# Patient Record
Sex: FEMALE | Race: WHITE
[De-identification: names, ages, dates, MRNs, and addresses within clinical notes are randomized per-mention and may not be internally consistent; named-entity substitution may affect disease eponyms.]

---

## 2019-02-19 ENCOUNTER — HOSPITAL ENCOUNTER (OUTPATIENT)
Dept: HOSPITAL 92 - LABBT | Age: 29
Discharge: HOME | End: 2019-02-19
Attending: UROLOGY
Payer: COMMERCIAL

## 2019-02-19 DIAGNOSIS — N20.0: ICD-10-CM

## 2019-02-19 DIAGNOSIS — Z01.812: Primary | ICD-10-CM

## 2019-02-19 LAB
ANION GAP SERPL CALC-SCNC: 14 MMOL/L (ref 10–20)
APTT PPP: 29.6 SEC (ref 22.9–36.1)
BUN SERPL-MCNC: 9 MG/DL (ref 7–18.7)
CALCIUM SERPL-MCNC: 9.6 MG/DL (ref 7.8–10.44)
CHLORIDE SERPL-SCNC: 108 MMOL/L (ref 98–107)
CO2 SERPL-SCNC: 22 MMOL/L (ref 22–29)
CREAT CL PREDICTED SERPL C-G-VRATE: 0 ML/MIN (ref 70–130)
GLUCOSE SERPL-MCNC: 112 MG/DL (ref 70–105)
HGB BLD-MCNC: 14.1 G/DL (ref 12–16)
INR PPP: 0.9
MCH RBC QN AUTO: 31.7 PG (ref 27–31)
MCV RBC AUTO: 95.3 FL (ref 78–98)
PLATELET # BLD AUTO: 245 THOU/UL (ref 130–400)
PLATELET # BLD: 249 THOU/UL (ref 130–400)
POTASSIUM SERPL-SCNC: 3.7 MMOL/L (ref 3.5–5.1)
PREGS CONTROL BACKGROUND?: (no result)
PREGS CONTROL BAR APPEAR?: YES
PROTHROMBIN TIME: 12.4 SEC (ref 12–14.7)
RBC # BLD AUTO: 4.44 MILL/UL (ref 4.2–5.4)
SODIUM SERPL-SCNC: 140 MMOL/L (ref 136–145)
WBC # BLD AUTO: 6.4 THOU/UL (ref 4.8–10.8)

## 2019-02-19 PROCEDURE — 85027 COMPLETE CBC AUTOMATED: CPT

## 2019-02-19 PROCEDURE — 80048 BASIC METABOLIC PNL TOTAL CA: CPT

## 2019-02-19 PROCEDURE — 85576 BLOOD PLATELET AGGREGATION: CPT

## 2019-02-19 PROCEDURE — 85610 PROTHROMBIN TIME: CPT

## 2019-02-19 PROCEDURE — 85730 THROMBOPLASTIN TIME PARTIAL: CPT

## 2019-02-19 PROCEDURE — 84703 CHORIONIC GONADOTROPIN ASSAY: CPT

## 2019-02-20 ENCOUNTER — HOSPITAL ENCOUNTER (OUTPATIENT)
Dept: HOSPITAL 92 - SDC | Age: 29
Setting detail: OBSERVATION
LOS: 1 days | Discharge: HOME | End: 2019-02-21
Attending: UROLOGY | Admitting: UROLOGY
Payer: COMMERCIAL

## 2019-02-20 VITALS — BODY MASS INDEX: 24.3 KG/M2

## 2019-02-20 DIAGNOSIS — Z79.899: ICD-10-CM

## 2019-02-20 DIAGNOSIS — N20.0: Primary | ICD-10-CM

## 2019-02-20 LAB — HGB BLD-MCNC: 12.8 G/DL (ref 12–16)

## 2019-02-20 PROCEDURE — 90471 IMMUNIZATION ADMIN: CPT

## 2019-02-20 PROCEDURE — 90686 IIV4 VACC NO PRSV 0.5 ML IM: CPT

## 2019-02-20 PROCEDURE — 36415 COLL VENOUS BLD VENIPUNCTURE: CPT

## 2019-02-20 PROCEDURE — 96361 HYDRATE IV INFUSION ADD-ON: CPT

## 2019-02-20 PROCEDURE — 85018 HEMOGLOBIN: CPT

## 2019-02-20 PROCEDURE — 74018 RADEX ABDOMEN 1 VIEW: CPT

## 2019-02-20 PROCEDURE — G0378 HOSPITAL OBSERVATION PER HR: HCPCS

## 2019-02-20 PROCEDURE — 0TF4XZZ FRAGMENTATION IN LEFT KIDNEY PELVIS, EXTERNAL APPROACH: ICD-10-PCS | Performed by: UROLOGY

## 2019-02-20 PROCEDURE — G0008 ADMIN INFLUENZA VIRUS VAC: HCPCS

## 2019-02-20 PROCEDURE — 96360 HYDRATION IV INFUSION INIT: CPT

## 2019-02-20 RX ADMIN — POTASSIUM CHLORIDE, DEXTROSE MONOHYDRATE AND SODIUM CHLORIDE SCH MLS: 150; 5; 450 INJECTION, SOLUTION INTRAVENOUS at 17:45

## 2019-02-20 RX ADMIN — BUPROPION HYDROCHLORIDE SCH MG: 100 TABLET, EXTENDED RELEASE ORAL at 20:45

## 2019-02-20 NOTE — OP
DATE OF PROCEDURE:  02/20/2019



PREOPERATIVE DIAGNOSIS:  Left renal stones.



POSTOPERATIVE DIAGNOSIS:  Left renal stones.



PROCEDURE PERFORMED:  Left extracorporeal shock wave lithotripsy.



ANESTHETIC:  General.



EBL:  Not recorded.



FINDINGS:  She had 2 stones seen, probably 3 mm and possibly 1 almost 4 mm in size.

The 1 in the mid ureter was treated with 850 shocks at maximum kv level of 5.  The 1

in the lower pole was treated with 1150 shocks, maximum kv level of 5.  Both

appeared to fragment well.  No stent was placed.  No other stones were seen. 



DESCRIPTION OF PROCEDURE:  After obtaining written and verbal consent from the

patient, after documenting normal preoperative blood work and platelet function

assay, and being sure we could see stones on her KUB, she was taken to the operating

suite.  She was placed in the supine position on the treatment table.  PlexiPulses

were placed on lower extremities and turned on.  She was given a general anesthetic

and oral obturator intubation.  She was coupled to the lithotripsy unit.  The

initial stone in the mid kidney was placed in the treatment focal point, shockwave

therapy was commenced at a rate of 60 at a very low kv.  After couple 100 shocks, a

few minutes pause was given and then, we reinstituted the shock wave and so we

brought up to the level of 5 after about 850 shocks.  There was no sizable fragments

remaining in this region.  She was then repositioned to treat the lower pole stone,

which was treated with a 1150 shocks and also appeared to fragment well.  We then

spent about 10 minutes looking for any other stones or stone fragments and I could

not find anything else.  At this point, the procedure was terminated and she was

awakened, extubated, and taken by stretcher to recovery room. 







Job ID:  [id number]

## 2019-02-20 NOTE — RAD
KUB:

 

Comparison: [id number]

 

History: Pre-operative patient, history of renal stone disease. 

 

FINDINGS: 

Two subcentimeter calcifications are seen in the left upper quadrant, likely within the left kidney, 
one measuring 4 mm overlying the left 12th rib and one measuring approximately 3 mm overlying the dis
rivera tip of the left 12th rib. No right upper quadrant calcifications are noted. Calcifications in the
 pelvis suggest phleboliths. The bowel gas pattern is nonobstructed. Osseous structures demonstrate n
o acute findings. 

 

IMPRESSION: 

Two subcentimeter calcifications in the left upper quadrant suggesting left renal calculi. 

 

POS: SIMI

## 2019-02-20 NOTE — HP
A 28-year-old white female, who underwent left ESWL without stent today for 2

relatively small renal stones that were treated with a total of 2000 shocks at level

5.  Her preoperative blood work and platelet function assay were normal.  She did

well postop, but then urinated grossly bloody urine and then had significant left

flank pain shortly after that requiring some morphine to be given.  Her vital signs

have been good.  Hemoglobin is 12.8.  She did receive 2.5 L of IV fluids and so was

considering IV fluid replacement compared to yesterday and dropped slightly, but not

probably significantly.  She is not tachycardic or hypotensive.  She does have some

left costovertebral angle tenderness.  Abdomen is otherwise soft and at most,

minimally tender without rebound or guarding.  She started to feel somewhat better,

but she lives hour and a half away.  We will go ahead at this point and place her in

23-hour observation because of the significant pain she had the gross hematuria,

which is a bit more than then we would normally see with ESWL and the concern that

she could be passing some either stone fragments that are hurting her or perhaps

some blood clots that are hurting her or on the outside chance, she is developing a

renal hematoma.  I do not think she meets any criteria for CAT scan right now.  I

think just IV fluids and rest should be adequate and if she is feeling much better

over the next few hours, we can let her go home. 







Job ID:  [id number]

## 2019-02-21 VITALS — DIASTOLIC BLOOD PRESSURE: 78 MMHG | TEMPERATURE: 97.9 F | SYSTOLIC BLOOD PRESSURE: 113 MMHG

## 2019-02-21 RX ADMIN — BUPROPION HYDROCHLORIDE SCH MG: 100 TABLET, EXTENDED RELEASE ORAL at 09:46

## 2019-02-21 RX ADMIN — POTASSIUM CHLORIDE, DEXTROSE MONOHYDRATE AND SODIUM CHLORIDE SCH: 150; 5; 450 INJECTION, SOLUTION INTRAVENOUS at 04:30
